# Patient Record
Sex: FEMALE | Race: WHITE
[De-identification: names, ages, dates, MRNs, and addresses within clinical notes are randomized per-mention and may not be internally consistent; named-entity substitution may affect disease eponyms.]

---

## 2019-09-12 ENCOUNTER — HOSPITAL ENCOUNTER (EMERGENCY)
Dept: HOSPITAL 25 - UCEAST | Age: 39
Discharge: HOME | End: 2019-09-12
Payer: COMMERCIAL

## 2019-09-12 VITALS — DIASTOLIC BLOOD PRESSURE: 93 MMHG | SYSTOLIC BLOOD PRESSURE: 148 MMHG

## 2019-09-12 DIAGNOSIS — L03.114: Primary | ICD-10-CM

## 2019-09-12 DIAGNOSIS — F17.210: ICD-10-CM

## 2019-09-12 PROCEDURE — G0463 HOSPITAL OUTPT CLINIC VISIT: HCPCS

## 2019-09-12 PROCEDURE — 99202 OFFICE O/P NEW SF 15 MIN: CPT

## 2019-09-12 NOTE — UC
Skin Complaint HPI





- HPI Summary


HPI Summary: 


Patient is a 37yo female presenting with redness and complaint of possible skin 

infection of left forearm following what she believes to be an insect bite two 

days ago. She did not see an insect but reports feeling a sudden intense 

stinging on her forearm. She was seen at [facility] where they told her to 

keep an eye on it. Over the past few days it has continued to burn and itch. 

There was initially no redness but this morning she noticed some erythema that 

has slowly spread throughout the day. [place] told her to come here for 

possible cellulitis. She has not taken anything for her symptoms. She denies 

any allergies to medications or insects. She denies any associated pain. Denies 

any discharge from the site.








- History of Current Complaint


Chief Complaint: UCSkin


Time Seen by Provider: 09/12/19 21:51


Stated Complaint: INSECT BITE


Hx Obtained From: Patient


Hx Last Menstrual Period: 09/02/2019


Onset/Duration: Gradual Onset


Skin Exposure Onset/Duration: Days Ago


Onset Severity: Moderate


Current Severity: None


Pain Intensity: 0


Pain Scale Used: 0-10 Numeric





- Allergy/Home Medications


Allergies/Adverse Reactions: 


 Allergies











Allergy/AdvReac Type Severity Reaction Status Date / Time


 


No Known Allergies Allergy   Verified 09/12/19 21:10














PMH/Surg Hx/FS Hx/Imm Hx





- Surgical History


Surgical History: Yes


Surgery Procedure, Year, and Place: lasix surgery





- Family History


Known Family History: Positive: Non-Contributory





- Social History


Alcohol Use: Occasionally


Substance Use Type: None


Smoking Status (MU): Light Every Day Tobacco Smoker





Review of Systems


All Other Systems Reviewed And Are Negative: No


Constitutional: Positive: Negative.  Negative: Fever, Chills, Fatigue


Skin: Positive: Rash - erythematous.  Negative: Bruising


Respiratory: Positive: Negative


Cardiovascular: Positive: Negative


Gastrointestinal: Negative: Abdominal Pain, Vomiting, Diarrhea, Nausea


Motor: Positive: Negative


Neurovascular: Positive: Negative


Musculoskeletal: Positive: Negative


Neurological: Positive: Negative.  Negative: Headache, Paresthesia, Numbness





Physical Exam


Triage Information Reviewed: Yes


Appearance: Well-Appearing, No Pain Distress, Well-Nourished


Vital Signs: 


 Initial Vital Signs











Temp  98.0 F   09/12/19 21:10


 


Pulse  71   09/12/19 21:10


 


Resp  18   09/12/19 21:10


 


BP  148/93   09/12/19 21:10


 


Pulse Ox  99   09/12/19 21:10











Vital Signs Reviewed: Yes


Musculoskeletal Exam: Normal


Neurological Exam: Normal


Skin: Positive: Other - erythema and warmth to touch noted on posterior aspect 

of forearm. no discharge noted from site. no pain associated with palpation





Course/Dx





- Course


Course Of Treatment: 


Patient was directed to take Keflex as prescribed for treatment of your skin 

infection. She was given her first dose here. She was instructed to return or 

go to the emergency department if her symptoms persist or worsen. 








- Diagnoses


Provider Diagnosis: 


 Cellulitis








Discharge ED





- Sign-Out/Discharge


Documenting (check all that apply): Patient Departure


All imaging exams completed and their final reports reviewed: No Studies





- Discharge Plan


Condition: Stable


Disposition: HOME


Prescriptions: 


Cephalexin CAP* [Keflex CAP*] 500 mg PO TID #29 cap


Patient Education Materials:  Cellulitis (ED)


Referrals: 


No Primary Care Phys,NOPCP [Primary Care Provider] - 


Additional Instructions: 


Take Keflex as prescribed for treatment of your skin infection. Please return 

or go to the emergency department if your symptoms persist or worsen. 





- Billing Disposition and Condition


Condition: STABLE


Disposition: Home

## 2020-01-21 ENCOUNTER — HOSPITAL ENCOUNTER (OUTPATIENT)
Dept: HOSPITAL 25 - ED | Age: 40
Setting detail: OBSERVATION
LOS: 1 days | Discharge: HOME | End: 2020-01-22
Attending: SURGERY | Admitting: SURGERY
Payer: COMMERCIAL

## 2020-01-21 DIAGNOSIS — F17.210: ICD-10-CM

## 2020-01-21 DIAGNOSIS — R10.12: ICD-10-CM

## 2020-01-21 DIAGNOSIS — R11.2: ICD-10-CM

## 2020-01-21 DIAGNOSIS — K35.80: Primary | ICD-10-CM

## 2020-01-21 LAB
ALBUMIN SERPL BCG-MCNC: 4.2 G/DL (ref 3.2–5.2)
ALBUMIN/GLOB SERPL: 1.4 {RATIO} (ref 1–3)
ALP SERPL-CCNC: 87 U/L (ref 34–104)
ALT SERPL W P-5'-P-CCNC: 19 U/L (ref 7–52)
ANION GAP SERPL CALC-SCNC: 7 MMOL/L (ref 2–11)
AST SERPL-CCNC: 17 U/L (ref 13–39)
BASOPHILS # BLD AUTO: 0 10^3/UL (ref 0–0.2)
BUN SERPL-MCNC: 11 MG/DL (ref 6–24)
BUN/CREAT SERPL: 13.6 (ref 8–20)
CALCIUM SERPL-MCNC: 9.5 MG/DL (ref 8.6–10.3)
CHLORIDE SERPL-SCNC: 106 MMOL/L (ref 101–111)
EOSINOPHIL # BLD AUTO: 0.1 10^3/UL (ref 0–0.6)
GLOBULIN SER CALC-MCNC: 3 G/DL (ref 2–4)
GLUCOSE SERPL-MCNC: 103 MG/DL (ref 70–100)
HCG SERPL QL: < 0.6 MIU/ML
HCO3 SERPL-SCNC: 23 MMOL/L (ref 22–32)
HCT VFR BLD AUTO: 41 % (ref 35–47)
HGB BLD-MCNC: 14.1 G/DL (ref 12–16)
LYMPHOCYTES # BLD AUTO: 1.5 10^3/UL (ref 1–4.8)
MCH RBC QN AUTO: 32 PG (ref 27–31)
MCHC RBC AUTO-ENTMCNC: 34 G/DL (ref 31–36)
MCV RBC AUTO: 92 FL (ref 80–97)
MONOCYTES # BLD AUTO: 0.4 10^3/UL (ref 0–0.8)
NEUTROPHILS # BLD AUTO: 5 10^3/UL (ref 1.5–7.7)
NRBC # BLD AUTO: 0 10^3/UL
NRBC BLD QL AUTO: 0.1
PLATELET # BLD AUTO: 243 10^3/UL (ref 150–450)
POTASSIUM SERPL-SCNC: 3.8 MMOL/L (ref 3.5–5)
PROT SERPL-MCNC: 7.2 G/DL (ref 6.4–8.9)
RBC # BLD AUTO: 4.47 10^6 /UL (ref 3.7–4.87)
RBC UR QL AUTO: (no result)
SODIUM SERPL-SCNC: 136 MMOL/L (ref 135–145)
WBC # BLD AUTO: 7.1 10^3/UL (ref 3.5–10.8)
WBC UR QL AUTO: (no result)

## 2020-01-21 PROCEDURE — 81003 URINALYSIS AUTO W/O SCOPE: CPT

## 2020-01-21 PROCEDURE — 99284 EMERGENCY DEPT VISIT MOD MDM: CPT

## 2020-01-21 PROCEDURE — 87086 URINE CULTURE/COLONY COUNT: CPT

## 2020-01-21 PROCEDURE — 85025 COMPLETE CBC W/AUTO DIFF WBC: CPT

## 2020-01-21 PROCEDURE — 81015 MICROSCOPIC EXAM OF URINE: CPT

## 2020-01-21 PROCEDURE — 96376 TX/PRO/DX INJ SAME DRUG ADON: CPT

## 2020-01-21 PROCEDURE — C1776 JOINT DEVICE (IMPLANTABLE): HCPCS

## 2020-01-21 PROCEDURE — 74177 CT ABD & PELVIS W/CONTRAST: CPT

## 2020-01-21 PROCEDURE — 84702 CHORIONIC GONADOTROPIN TEST: CPT

## 2020-01-21 PROCEDURE — G0378 HOSPITAL OBSERVATION PER HR: HCPCS

## 2020-01-21 PROCEDURE — 96375 TX/PRO/DX INJ NEW DRUG ADDON: CPT

## 2020-01-21 PROCEDURE — 86140 C-REACTIVE PROTEIN: CPT

## 2020-01-21 PROCEDURE — G0379 DIRECT REFER HOSPITAL OBSERV: HCPCS

## 2020-01-21 PROCEDURE — 83690 ASSAY OF LIPASE: CPT

## 2020-01-21 PROCEDURE — 83605 ASSAY OF LACTIC ACID: CPT

## 2020-01-21 PROCEDURE — 36415 COLL VENOUS BLD VENIPUNCTURE: CPT

## 2020-01-21 PROCEDURE — 80053 COMPREHEN METABOLIC PANEL: CPT

## 2020-01-21 PROCEDURE — 76830 TRANSVAGINAL US NON-OB: CPT

## 2020-01-21 PROCEDURE — 96374 THER/PROPH/DIAG INJ IV PUSH: CPT

## 2020-01-21 PROCEDURE — 88304 TISSUE EXAM BY PATHOLOGIST: CPT

## 2020-01-21 NOTE — ED
Abdominal Pain/Female





- HPI Summary


HPI Summary: 





Patient complains of left upper quadrant pain starting 2 days ago now radiating 

to mid abdomen and right lower quadrant.  Intense severe pain 2 days ago with 

nausea or vomiting.  Less pain yesterday and today, with no associated nausea 

or vomiting or diarrhea.  Pain currently rated 4/10 with palpation.  No pain 

when sitting at rest.  Patient eating and drinking normally yesterday and 

today.  Denies fever, cough, sore throat, CP, SOB, change in urine, change in BM

, vaginal symptoms.  LMP 1 month ago.  Patient sent from [facility] to ED 

for further evaluation of right lower quadrant pain.  Medical history is none.  

Abdominal surgical history is none.  Positive smoker.





- History of Current Complaint


Chief Complaint: EDAbdPain


Stated Complaint: ABDOMINAL PAIN PER PT


Time Seen by Provider: 01/21/20 21:03


Hx Obtained From: Patient


Hx Last Menstrual Period: 09/02/2019


Onset/Duration: Sudden Onset, Lasting Days


Timing: Days


Severity Initially: Severe


Severity Currently: None


Pain Intensity: 0


Pain Scale Used: 0-10 Numeric


Location: Discrete At: RLQ, Discrete At: LUQ, Umbilical


Radiates: No


Aggravating Factor(s): Nothing


Alleviating Factor(s): Nothing


Associated Signs and Symptoms: Positive: Nausea, Vomiting


Allergies/Adverse Reactions: 


 Allergies











Allergy/AdvReac Type Severity Reaction Status Date / Time


 


No Known Allergies Allergy   Verified 09/12/19 21:10











Home Medications: 


 Home Medications





NK [No Home Medications Reported]  01/21/20 [History Confirmed 01/21/20]











PMH/Surg Hx/FS Hx/Imm Hx


Endocrine/Hematology History: 


   Denies: Hx Anticoagulant Therapy


Cardiovascular History: 


   Denies: Hx Pacemaker/ICD


 History: 


   Denies: Hx Dialysis


Sensory History: 


   Denies: Hx Eye Prosthesis


Opthamlomology History: 


   Denies: Hx Legally Blind


EENT History: 


   Denies: Hx Deafness


Neurological History: 


   Denies: Hx Dementia





- Surgical History


Surgery Procedure, Year, and Place: lasix surgery


Infectious Disease History: No


Infectious Disease History: Reports: Traveled Outside the  in Last 30 Days - 

[place], [place], [facility] and [place].





- Family History


Known Family History: Positive: Non-Contributory





- Social History


Alcohol Use: Occasionally


Substance Use Type: Reports: None


Smoking Status (MU): Light Every Day Tobacco Smoker





Review of Systems


Constitutional: Negative


Eyes: Negative


ENT: Negative


Cardiovascular: Negative


Respiratory: Negative


Positive: Abdominal Pain, Vomiting, Nausea


Genitourinary: Negative


Musculoskeletal: Negative


Skin: Negative


Neurological: Negative


Psychological: Normal


All Other Systems Reviewed And Are Negative: Yes





Physical Exam





- Summary


Physical Exam Summary: 





Positive Osei's.  Moderate suprapubic pain.  Abdominal exam otherwise 

unremarkable.


Triage Information Reviewed: Yes


Vital Signs On Initial Exam: 


 Initial Vitals











Temp Pulse Resp BP Pulse Ox


 


 98 F   86   18   149/101   99 


 


 01/21/20 20:06  01/21/20 20:06  01/21/20 20:06  01/21/20 20:06  01/21/20 20:06











Vital Signs Reviewed: Yes


Appearance: Positive: Well-Appearing


Skin: Positive: Warm


Head/Face: Positive: Normal Head/Face Inspection


Eyes: Positive: Normal


Neck: Positive: Supple


Respiratory/Lung Sounds: Positive: Clear to Auscultation


Cardiovascular: Positive: Normal


Abdomen Description: Positive: Other:


Musculoskeletal: Positive: Normal


Neurological: Positive: Normal


Psychiatric: Positive: Normal


AVPU Assessment: Alert





- [place] Coma Scale


Best Eye Response: 4 - Spontaneous


Best Motor Response: 6 - Obeys Commands


Best Verbal Response: 5 - Oriented


Coma Scale Total: 15





Procedures





- Sedation


Patient Received Moderate/Deep Sedation with Procedure: No





Diagnostics





- Vital Signs


 Vital Signs











  Temp Pulse Resp BP Pulse Ox


 


 01/21/20 20:06  98 F  86  18  149/101  99














- Laboratory


Result Diagrams: 


 01/21/20 21:20





 01/21/20 21:20


Lab Statement: Any lab studies that have been ordered have been reviewed, and 

results considered in the medical decision making process.





Abdominal Pain Fem Course/Dx





- Course


Course Of Treatment: Patient complains of left upper quadrant pain starting 2 

days ago now radiating to mid abdomen and right lower quadrant.  Intense severe 

pain 2 days ago with nausea or vomiting.  Less pain yesterday and today, with 

no associated nausea or vomiting or diarrhea.  Pain currently rated 4/10 with 

palpation.  No pain when sitting at rest.  Patient eating and drinking normally 

yesterday and today.  Denies fever, cough, sore throat, CP, SOB, change in urine

, change in BM, vaginal symptoms.  LMP 1 month ago.  Patient sent from [facility] to ED for further evaluation of right lower quadrant pain.  Medical 

history is none.  Abdominal surgical history is none.  Vital signs within 

normal limits.  Labs unremarkable.  Transient transvaginal ultrasound positive 

for bulky uterus measured 10.4 x 6 x 8.6 cm with diffusely heterogeneous 

echotexture.  This may be diffuse leiomyomas .  There is not a well-defined 

mass.  The endometrium is largely obscured.  No fluid collection is seen.  

Right ovary measures 2.2 x 2.4 x 2.1 cm without mass or current ultrasound 

evidence for torsion.  Color flown vascular waveforms are documented.  Left 

adnexa: The left ovary could not be visualized due to bowel gas.  CT abdomen 

and pelvis with IV current chest only positive for enlarged and thick-walled 

appendix measuring up to 10 mm in diameter with limited surrounding 

inflammatory change consistent with appendicitis.  No abscess or obstruction.  

Enlarged uterus with multiple large fibroids including 9 cm fibroid at the 

fundus, 7 cm fibroid lower uterine segment, and at pedunculated 6 cm fibroid at 

the uterine fundus.  Patient admitted to surgery Dr. Mendoza





- Diagnoses


Provider Diagnoses: 


 Appendicitis, Fibroids








Discharge ED





- Sign-Out/Discharge


Documenting (check all that apply): Patient Departure





- Discharge Plan


Condition: Stable


Disposition: ADMITTED TO [facility]


Referrals: 


VIJAY Astudillo [Primary Care Provider] - 





- Billing Disposition and Condition


Condition: STABLE


Disposition: Admitted to [facility]

## 2020-01-22 VITALS — SYSTOLIC BLOOD PRESSURE: 133 MMHG | DIASTOLIC BLOOD PRESSURE: 82 MMHG

## 2020-01-22 RX ADMIN — HYDROMORPHONE HYDROCHLORIDE PRN MG: 1 INJECTION, SOLUTION INTRAMUSCULAR; INTRAVENOUS; SUBCUTANEOUS at 11:48

## 2020-01-22 RX ADMIN — CEFOXITIN SODIUM SCH MLS/HR: 2 INJECTION, SOLUTION INTRAVENOUS at 00:27

## 2020-01-22 RX ADMIN — CEFOXITIN SODIUM SCH: 2 INJECTION, SOLUTION INTRAVENOUS at 02:00

## 2020-01-22 RX ADMIN — CEFOXITIN SODIUM SCH: 2 INJECTION, SOLUTION INTRAVENOUS at 08:59

## 2020-01-22 RX ADMIN — HYDROMORPHONE HYDROCHLORIDE PRN MG: 1 INJECTION, SOLUTION INTRAMUSCULAR; INTRAVENOUS; SUBCUTANEOUS at 13:25

## 2020-01-22 NOTE — DS
Admitted: 1/21/2020


Discharged: 1/22/2020


Admission diagnosis: Appendicitis


Discharge diagnosis: Appendicitis


Condition at discharge: Stable





PEX:


General: Alert, in NAD or discomfort


Integumentary: No rashes or jaundice


HEENT: Oropharynx clear


Heart: RRR, no MRG


Lungs: CTAB, no WRR


ABD: Soft, nondistended. Mildly tender around incisions, which are C/D/I with 

no erythema or warmth. 


Extremities: Calves soft and nontender. Distal pulses intact bilaterally. No 

edema.





Procedure performed: Appendectomy





Pertinent labs: Unremarkable





Hospital Course: Presented to ED on 1/21/2020 for ABD pain, CT showed 

appendicitis and fibroids. Admitted on 1/21/2020 to surgical service. The 

following day, she underwent an appendectomy. Procedure was tolerated well and 

she was discharged home in stable condition. 





Discharge instructions provided to pt regarding diet, meds, activity, and post 

op follow up. All questions were answered. Discharged home in stable condition 

on 1/22/2020.

## 2020-01-25 NOTE — OP
CC:  [facility] *

 

DATE OF OPERATION:  01/21/20 - ROOM #331

 

DATE OF BIRTH:  [birth year]

 

SURGEON:  Steffen Mendoza MD

 

ASSISTANT:  None.

 

ANESTHESIOLOGIST:  Dr. Duff.

 

ANESTHESIA:  General endotracheal.

 

PRE-OP DIAGNOSIS:  Acute appendicitis.

 

POST-OP DIAGNOSIS:  Acute appendicitis.

 

OPERATIVE PROCEDURE:  Laparoscopic appendectomy.

 

ESTIMATED BLOOD LOSS:  Minimal.

 

IV FLUIDS:  Crystalloids.

 

SPECIMENS:  Appendix.

 

DRAINS:  None.

 

COMPLICATIONS:  None.

 

COUNT:  Instrument, needle, and sponge count correct.

 

DESCRIPTION OF PROCEDURE:  The patient was brought to the operating room and 
placed on the table supine.  Sequential compression devices were placed on both 
lower extremities.  General anesthesia was administered.  The abdomen was 
prepped and draped in the usual sterile fashion and a time-out was performed.

 

Local anesthetic was infiltrated into the skin and soft tissue prior to making 
each incision.  A transumbilical vertical incision was created and an open 
technique was used to access the peritoneal cavity after which a 12 mm trocar 
was placed and carbon dioxide was insufflated to a pressure of 15 mmHg.  Under 
direct visualization, 5 mm trocar was placed in the suprapubic midline and 12 
mm trocar placed in the left upper quadrant.  The appendix was identified in 
the right lower quadrant and appeared to have suppurative changes.  There was 
significant inflammation of the mesentery.  Appendix was elevated, a window 
created in the mesentery of the appendix at the base, the appendix was divided 
from the cecum with the EndoGIA stapler with a tan cartridge.  The mesentery of 
the appendix was divided with the EndoGIA stapler with gray cartridge.  The 
appendix was placed in an endoscopic retrieval bag and retrieved through the 
umbilical site.  The appendix had to be retrieved piecemeal due to the large 
amount of inflammation in the mesentery that made it difficult to remove it.  
Once the specimen was removed, hemostasis was assured.  Ports removed under 
direct visualization.  Carbon dioxide was released.  The umbilical wound was 
closed with 0 Vicryl in a figure-of-eight fashion to approximate the fascia.  
The skin incisions were closed with 4-0 Monocryl in a subcuticular fashion and 
DermaFlex was applied.  The patient tolerated the procedure well.  She was 
extubated uneventfully and transferred to the recovery room in stable condition.

 

 [id number]/CPS #: [id number]

[facility]